# Patient Record
Sex: FEMALE | URBAN - METROPOLITAN AREA
[De-identification: names, ages, dates, MRNs, and addresses within clinical notes are randomized per-mention and may not be internally consistent; named-entity substitution may affect disease eponyms.]

---

## 2019-05-30 ENCOUNTER — OFFICE VISIT (OUTPATIENT)
Dept: URBAN - METROPOLITAN AREA CLINIC 40 | Facility: CLINIC | Age: 49
End: 2019-05-30
Payer: COMMERCIAL

## 2019-05-30 DIAGNOSIS — E11.9 TYPE 2 DIABETES MELLITUS W/O COMPLICATION: Primary | ICD-10-CM

## 2019-05-30 PROCEDURE — 92014 COMPRE OPH EXAM EST PT 1/>: CPT | Performed by: OPTOMETRIST

## 2019-05-30 ASSESSMENT — KERATOMETRY
OD: 46.13
OS: 47.50

## 2019-05-30 NOTE — IMPRESSION/PLAN
Impression: Type 2 diabetes mellitus w/o complication: O24.1. Plan: Diabetes type II: no background retinopathy, no signs of neovascularization or macular edema noted. Discussed ocular and systemic benefits of blood sugar control. Schedule refraction.

## 2019-07-16 ENCOUNTER — OFFICE VISIT (OUTPATIENT)
Dept: URBAN - METROPOLITAN AREA CLINIC 40 | Facility: CLINIC | Age: 49
End: 2019-07-16
Payer: COMMERCIAL

## 2019-07-16 DIAGNOSIS — H52.4 PRESBYOPIA: Primary | ICD-10-CM

## 2019-07-16 PROCEDURE — 92014 COMPRE OPH EXAM EST PT 1/>: CPT | Performed by: OPTOMETRIST

## 2019-07-16 ASSESSMENT — VISUAL ACUITY
OS: 20/40
OD: 20/50

## 2020-11-17 ENCOUNTER — OFFICE VISIT (OUTPATIENT)
Dept: URBAN - METROPOLITAN AREA CLINIC 40 | Facility: CLINIC | Age: 50
End: 2020-11-17
Payer: COMMERCIAL

## 2020-11-17 DIAGNOSIS — E10.9 TYPE 1 DIABETES MELLITUS W/O COMPLICATION: Primary | ICD-10-CM

## 2020-11-17 PROCEDURE — 99214 OFFICE O/P EST MOD 30 MIN: CPT | Performed by: OPTOMETRIST

## 2020-11-17 PROCEDURE — 99204 OFFICE O/P NEW MOD 45 MIN: CPT | Performed by: OPTOMETRIST

## 2020-11-17 ASSESSMENT — KERATOMETRY
OS: 46.00
OD: 45.00

## 2020-11-17 NOTE — IMPRESSION/PLAN
Impression: Type 1 diabetes mellitus w/o complication: F36.7. Plan: Diabetes type I: no background retinopathy, no signs of neovascularization noted. Discussed ocular and systemic benefits of blood sugar control.